# Patient Record
Sex: FEMALE | Race: ASIAN | NOT HISPANIC OR LATINO | Employment: OTHER | URBAN - METROPOLITAN AREA
[De-identification: names, ages, dates, MRNs, and addresses within clinical notes are randomized per-mention and may not be internally consistent; named-entity substitution may affect disease eponyms.]

---

## 2022-07-13 ENCOUNTER — OFFICE VISIT (OUTPATIENT)
Dept: FAMILY MEDICINE | Facility: CLINIC | Age: 37
End: 2022-07-13
Payer: COMMERCIAL

## 2022-07-13 VITALS
TEMPERATURE: 98.3 F | DIASTOLIC BLOOD PRESSURE: 85 MMHG | HEART RATE: 77 BPM | RESPIRATION RATE: 15 BRPM | OXYGEN SATURATION: 99 % | WEIGHT: 176 LBS | SYSTOLIC BLOOD PRESSURE: 130 MMHG

## 2022-07-13 DIAGNOSIS — M79.672 PAIN OF LEFT MIDFOOT: Primary | ICD-10-CM

## 2022-07-13 PROCEDURE — 99203 OFFICE O/P NEW LOW 30 MIN: CPT | Performed by: FAMILY MEDICINE

## 2022-07-13 RX ORDER — NAPROXEN 250 MG/1
250 TABLET ORAL 2 TIMES DAILY WITH MEALS
Qty: 50 TABLET | Refills: 0 | Status: SHIPPED | OUTPATIENT
Start: 2022-07-13 | End: 2022-08-01

## 2022-07-13 NOTE — PATIENT INSTRUCTIONS
May try cushioned mats when standing a long time (ie kitchen).     Ice packs on the foot when very painful.     Take naproxen twice a day with food for 1-2 weeks. If no help may stop otherwise may use as needed.     Referral to podiatry, foot doctor for further management.   Call [phone] to make appointment at [facility] for sometime in the next month or so.     Return sooner if worse.

## 2022-07-14 NOTE — PROGRESS NOTES
Assessment/Plan:   Pain of left midfoot  Persistent intermittent pain in the distal left midfoot dorsally. Consider a tendonitis or neuroma. Will use regular dosing antiinflammatory with food, ice, rest, avoid positions that increase pain and follow up with podiatry for further management if not improving with the antiinflammatory.   - naproxen (NAPROSYN) 250 MG tablet; Take 1 tablet (250 mg) by mouth 2 times daily (with meals) Take with food for 1-2 weeks regularly then as needed  Dispense: 50 tablet; Refill: 0  - Orthopedic  Referral; Future    Consider a neuroma.     I discussed red flag symptoms, return precautions to clinic/ER and follow up care with patient/parent.  Expected clinical course, symptomatic cares advised. Questions answered. Patient/parent amenable with plan.    May try cushioned mats when standing a long time (ie kitchen).     Ice packs on the foot when very painful.     Take naproxen twice a day with food for 1-2 weeks. If no help may stop otherwise may use as needed.     Referral to podiatry, foot doctor for further management.   Call [phone] to make appointment at [facility] for sometime in the next month or so.     Return sooner if worse.     Subjective:     Abelino Lozano is a 37 year old female who presents for evaluation of left foot pain. This has been present for about 6 months but getting worse. There was no injury or apparent overuse, no trigger from certain shoes.   She notices pain in the top of foot mainly mid to distal region. Pain can occur if she is on her feet too long - standing, such as making dinner. The worst pain usually comes after she has been sitting cross legged on the floor or sofa for a period of time which she does often. Then the pain is severe and limits walking for awhile. No rash, no paresthesias, no redness or swelling.   Occurs barefoot or with shoes.   Otherwise generally healthy.     No Known Allergies     Current Outpatient Medications    Medication     naproxen (NAPROSYN) 250 MG tablet     No current facility-administered medications for this visit.     There is no problem list on file for this patient.      Objective:     /85   Pulse 77   Temp 98.3  F (36.8  C)   Resp 15   Wt 79.8 kg (176 lb)   LMP 06/20/2022   SpO2 99%   Breastfeeding No     Physical    General Appearance: Alert, pleasant, no distress, aVSS. Breathing easy, normal gait.   Head: Normocephalic, without obvious abnormality, atraumatic  Eyes: Conjunctivae are normal.   Extremities: No lower extremity edema. No swelling or redness or deformity of left foot. Palpable pedal pulses and normal cap refill. Reproducible tenderness left dorsal mid to distal foot between 3-4 toes/MTs. No pain at the achilles or heel or plantar fascia. Ankle and toe motion normal.   Skin: Skin color, texture, turgor normal, no rashes or lesions  Psychiatric: Patient has a normal mood and affect.     This note has been dictated in part using voice recognition software.  Any grammatical or context distortions are unintentional and inherent to the software.  Please feel free to contact me directly for clarification if needed.

## 2022-07-29 ENCOUNTER — OFFICE VISIT (OUTPATIENT)
Dept: FAMILY MEDICINE | Facility: CLINIC | Age: 37
End: 2022-07-29
Payer: COMMERCIAL

## 2022-07-29 VITALS
SYSTOLIC BLOOD PRESSURE: 122 MMHG | WEIGHT: 177 LBS | BODY MASS INDEX: 33.42 KG/M2 | HEART RATE: 80 BPM | TEMPERATURE: 97.1 F | DIASTOLIC BLOOD PRESSURE: 78 MMHG | HEIGHT: 61 IN

## 2022-07-29 DIAGNOSIS — Z13.0 SCREENING FOR ENDOCRINE, METABOLIC AND IMMUNITY DISORDER: ICD-10-CM

## 2022-07-29 DIAGNOSIS — R73.03 PREDIABETES: ICD-10-CM

## 2022-07-29 DIAGNOSIS — Z13.220 SCREENING CHOLESTEROL LEVEL: ICD-10-CM

## 2022-07-29 DIAGNOSIS — Z13.29 SCREENING FOR ENDOCRINE, METABOLIC AND IMMUNITY DISORDER: ICD-10-CM

## 2022-07-29 DIAGNOSIS — Z13.228 SCREENING FOR ENDOCRINE, METABOLIC AND IMMUNITY DISORDER: ICD-10-CM

## 2022-07-29 DIAGNOSIS — Z11.59 NEED FOR HEPATITIS C SCREENING TEST: ICD-10-CM

## 2022-07-29 DIAGNOSIS — Z00.00 ENCOUNTER FOR PREVENTIVE CARE: Primary | ICD-10-CM

## 2022-07-29 DIAGNOSIS — Z11.4 SCREENING FOR HIV (HUMAN IMMUNODEFICIENCY VIRUS): ICD-10-CM

## 2022-07-29 LAB
CHOLEST SERPL-MCNC: 212 MG/DL
HBA1C MFR BLD: 6 % (ref 0–5.6)
HDLC SERPL-MCNC: 30 MG/DL
LDLC SERPL CALC-MCNC: 145 MG/DL
NONHDLC SERPL-MCNC: 182 MG/DL
TRIGL SERPL-MCNC: 185 MG/DL
TSH SERPL DL<=0.005 MIU/L-ACNC: 1.83 UIU/ML (ref 0.3–4.2)

## 2022-07-29 PROCEDURE — 87389 HIV-1 AG W/HIV-1&-2 AB AG IA: CPT | Performed by: NURSE PRACTITIONER

## 2022-07-29 PROCEDURE — 99395 PREV VISIT EST AGE 18-39: CPT | Performed by: NURSE PRACTITIONER

## 2022-07-29 PROCEDURE — 80061 LIPID PANEL: CPT | Performed by: NURSE PRACTITIONER

## 2022-07-29 PROCEDURE — 83036 HEMOGLOBIN GLYCOSYLATED A1C: CPT | Performed by: NURSE PRACTITIONER

## 2022-07-29 PROCEDURE — 86803 HEPATITIS C AB TEST: CPT | Performed by: NURSE PRACTITIONER

## 2022-07-29 PROCEDURE — 36415 COLL VENOUS BLD VENIPUNCTURE: CPT | Performed by: NURSE PRACTITIONER

## 2022-07-29 PROCEDURE — 84443 ASSAY THYROID STIM HORMONE: CPT | Performed by: NURSE PRACTITIONER

## 2022-07-29 NOTE — PROGRESS NOTES
"  Assessment & Plan     Screening for endocrine, metabolic and immunity disorder    - Hemoglobin A1c (aka HBA1C)  - TSH with free T4 reflex  - HIV Antigen Antibody Combo  - Hemoglobin A1c (aka HBA1C)  - TSH with free T4 reflex    Prediabetes    - Hemoglobin A1c (aka HBA1C)    Encounter for preventive care      Screening for HIV (human immunodeficiency virus)    - HIV Antigen Antibody Combo    Need for hepatitis C screening test    - Hepatitis C antibody  - Hepatitis C antibody    Screening cholesterol level    - Lipid Profile (Chol, Trig, HDL, LDL calc)  - Lipid Profile (Chol, Trig, HDL, LDL calc)    BMI 33.0-33.9,adult      - your cholesterol is elevated at 212. Your LDL is also elevated at 145. Your HDL (good cholesterol) is low at 30. Ways to improve these values is start to eat a diet low in saturated fats, follow the mediterranean diet, eat red meat no more than 1-2 per month, and try to exercise at least 150 minutes per week. Recheck this lab next year.   - Your HGBA1c is elevated and is now within the prediabetic range. We definitely want to prevent any further increase, 6.5 and higher is diabetes type II. I won't prescribe any medications at this time, but we need to monitor this lab. So please start to work on 150 minutes of exercise per week and low carbohydrate diet. Try to lose 1-2 pounds per week, goal is 5 lbs in three months. Recheck lab in 3 months.   - all other labs are within normal ranges.   - diet and exercise discussed.   :[id number]     BMI:   Estimated body mass index is 33.17 kg/m  as calculated from the following:    Height as of this encounter: 1.556 m (5' 1.25\").    Weight as of this encounter: 80.3 kg (177 lb).   Weight management plan: Discussed healthy diet and exercise guidelines        Return in about 3 months (around 10/29/2022) for Follow up, with me for weight check and labs.    MIKEY Molina [facility] [place]    Allen Roca is a 37 " "year old accompanied by her spouse, presenting for the following health issues:  Establish Care      She is present with her . She moved to [place] from [place] in November 2022. She is up to date on her Covid Vaccine and declined all other vaccines today. She has two children and noticing her weight is not going down and a bigger neck circumference. She would like to get routine labs done today. Denied any medical conditions and had c-sections for surgeries. She tries to eat healthy, does not drink soda or too much juice. Eats red rice and not as much white rice.      No sx currently. Has never been treated with medication.       How many servings of fruits and vegetables do you eat daily?  2-3    On average, how many sweetened beverages do you drink each day (Examples: soda, juice, sweet tea, etc.  Do NOT count diet or artificially sweetened beverages)?   0    How many days per week do you exercise enough to make your heart beat faster? 3 or less    How many minutes a day do you exercise enough to make your heart beat faster? 10 - 19    How many days per week do you miss taking your medication? 0      Review of Systems   Constitutional, HEENT, cardiovascular, pulmonary, GI, , musculoskeletal, neuro, skin, endocrine and psych systems are negative, except as otherwise noted.      Objective    /78 (BP Location: Right arm, Patient Position: Sitting, Cuff Size: Adult Large)   Pulse 80   Temp 97.1  F (36.2  C) (Temporal)   Ht 1.556 m (5' 1.25\")   Wt 80.3 kg (177 lb)   LMP 06/20/2022   BMI 33.17 kg/m    Body mass index is 33.17 kg/m .     Physical Exam   GENERAL: healthy, alert and no distress  EYES: Eyes grossly normal to inspection, PERRL and conjunctivae and sclerae normal  HENT: ear canals and TM's normal, nose and mouth without ulcers or lesions  NECK: no adenopathy, no asymmetry, masses, or scars and thyroid normal to palpation  RESP: lungs clear to auscultation - no rales, rhonchi or " wheezes  BREAST: normal without masses, tenderness or nipple discharge and no palpable axillary masses or adenopathy  CV: regular rate and rhythm, normal S1 S2, no S3 or S4, no murmur, click or rub, no peripheral edema and peripheral pulses strong  ABDOMEN: soft, nontender, no hepatosplenomegaly, no masses and bowel sounds normal  MS: no gross musculoskeletal defects noted, no edema  SKIN: no suspicious lesions or rashes  NEURO: Normal strength and tone, mentation intact and speech normal  PSYCH: mentation appears normal, affect normal/bright    No results found for any previous visit.     Recent Results (from the past 720 hour(s))   HIV Antigen Antibody Combo    Collection Time: 07/29/22 10:52 AM   Result Value Ref Range    HIV Antigen Antibody Combo Nonreactive Nonreactive   Hepatitis C antibody    Collection Time: 07/29/22 10:52 AM   Result Value Ref Range    Hepatitis C Antibody Nonreactive Nonreactive   Hemoglobin A1c (aka HBA1C)    Collection Time: 07/29/22 10:52 AM   Result Value Ref Range    Hemoglobin A1C 6.0 (H) 0.0 - 5.6 %   Lipid Profile (Chol, Trig, HDL, LDL calc)    Collection Time: 07/29/22 10:52 AM   Result Value Ref Range    Cholesterol 212 (H) <200 mg/dL    Triglycerides 185 (H) <150 mg/dL    Direct Measure HDL 30 (L) >=50 mg/dL    LDL Cholesterol Calculated 145 (H) <=100 mg/dL    Non HDL Cholesterol 182 (H) <130 mg/dL   TSH with free T4 reflex    Collection Time: 07/29/22 10:52 AM   Result Value Ref Range    TSH 1.83 0.30 - 4.20 uIU/mL     HI    - your cholesterol is elevated at 212. Your LDL is also elevated at 145. Your HDL (good cholesterol) is low at 30. Ways to improve these values is start to eat a diet low in saturated fats, follow the mediterranean diet, eat red meat no more than 1-2 per month, and try to exercise at least 150 minutes per week. Recheck this lab next year.   - Your HGBA1c is elevated and is now within the prediabetic range. We definitely want to prevent any further  increase, 6.5 and higher is diabetes type II. I won't prescribe any medications at this time, but we need to monitor this lab. So please start to work on 150 minutes of exercise per week and low carbohydrate diet. Try to lose 1-2 pounds per week, goal is 5 lbs in three months. Recheck lab in 3 months.   - all other labs are within normal ranges.     MIKEY Molina CNP                .  ..

## 2022-07-30 LAB
HCV AB SERPL QL IA: NONREACTIVE
HIV 1+2 AB+HIV1 P24 AG SERPL QL IA: NONREACTIVE

## 2022-08-01 PROBLEM — R73.03 PREDIABETES: Status: ACTIVE | Noted: 2022-08-01

## 2022-08-10 ENCOUNTER — OFFICE VISIT (OUTPATIENT)
Dept: PODIATRY | Facility: CLINIC | Age: 37
End: 2022-08-10
Attending: FAMILY MEDICINE
Payer: COMMERCIAL

## 2022-08-10 VITALS — HEIGHT: 61 IN | OXYGEN SATURATION: 98 % | HEART RATE: 93 BPM | WEIGHT: 177 LBS | BODY MASS INDEX: 33.42 KG/M2

## 2022-08-10 DIAGNOSIS — M21.6X1 PRONATION DEFORMITY OF BOTH FEET: Primary | ICD-10-CM

## 2022-08-10 DIAGNOSIS — M21.6X2 PRONATION DEFORMITY OF BOTH FEET: Primary | ICD-10-CM

## 2022-08-10 DIAGNOSIS — M79.672 PAIN OF LEFT MIDFOOT: ICD-10-CM

## 2022-08-10 PROCEDURE — 99203 OFFICE O/P NEW LOW 30 MIN: CPT | Performed by: PODIATRIST

## 2022-08-10 ASSESSMENT — PAIN SCALES - GENERAL: PAINLEVEL: NO PAIN (1)

## 2022-08-10 NOTE — PATIENT INSTRUCTIONS
What are Prescription Custom Orthotics?  Custom orthotics are specially-made devices designed to support and comfort your feet. Prescription orthotics are crafted for you and no one else. They match the contours of your feet precisely and are designed for the way you move. Orthotics are only manufactured after a podiatrist has conducted a complete evaluation of your feet, ankles, and legs, so the orthotic can accommodate your unique foot structure and pathology.  Prescription orthotics are divided into two categories:  Functional orthotics are designed to control abnormal motion. They may be used to treat foot pain caused by abnormal motion; they can also be used to treat injuries such as shin splints or tendinitis. Functional orthotics are usually crafted of a semi-rigid material such as plastic or graphite.  Accommodative orthotics are softer and meant to provide additional cushioning and support. They can be used to treat diabetic foot ulcers, painful calluses on the bottom of the foot, and other uncomfortable conditions.  Podiatrists use orthotics to treat foot problems such as plantar fasciitis, bursitis, tendinitis, diabetic foot ulcers, and foot, ankle, and heel pain. Clinical research studies have shown that podiatrist-prescribed foot orthotics decrease foot pain and improve function.  Orthotics typically cost more than shoe inserts purchased in a retail store, but the additional cost is usually well worth it. Unlike shoe inserts, orthotics are molded to fit each individual foot, so you can be sure that your orthotics fit and do what they're supposed to do. Prescription orthotics are also made of top-notch materials and last many years when cared for properly. Insurance often helps pay for prescription orthotics.  What are Shoe Inserts?   You've seen them at the grocery store and at the mall. You've probably even seen them on TV and online. Shoe inserts are any kind of non-prescription foot support designed  to be worn inside a shoe. Pre-packaged, mass produced, arch supports are shoe inserts. So are the  custom-made  insoles and foot supports that you can order online or at retail stores. Unless the device has been prescribed by a doctor and crafted for your specific foot, it's a shoe insert, not a custom orthotic device--despite what the ads might say.  Shoe inserts can be very helpful for a variety of foot ailments, including flat arches and foot and leg pain. They can cushion your feet, provide comfort, and support your arches, but they can't correct biomechanical foot problems or cure long-standing foot issues.  The most common types of shoe inserts are:  Arch supports: Some people have high arches. Others have low arches or flat feet. Arch supports generally have a  bumped-up  appearance and are designed to support the foot's natural arch.   Insoles: Insoles slip into your shoe to provide extra cushioning and support. Insoles are often made of gel, foam, or plastic.   Heel liners: Heel liners, sometimes called heel pads or heel cups, provide extra cushioning in the heel region. They may be especially useful for patients who have foot pain caused by age-related thinning of the heels' natural fat pads.   Foot cushions: Do your shoes rub against your heel or your toes? Foot cushions come in many different shapes and sizes and can be used as a barrier between you and your shoe.  Choosing an Over-the-Counter Shoe Insert  Selecting a shoe insert from the wide variety of devices on the market can be overwhelming. Here are some podiatrist-tested tips to help you find the insert that best meets your needs:  Consider your health. Do you have diabetes? Problems with circulation? An over-the-counter insert may not be your best bet. Diabetes and poor circulation increase your risk of foot ulcers and infections, so schedule an appointment with a podiatrist. He or she can help you select a solution that won't cause additional  health problems.   Think about the purpose. Are you planning to run a marathon, or do you just need a little arch support in your work shoes? Look for a product that fits your planned level of activity.   Bring your shoes. For the insert to be effective, it has to fit into your shoes. So bring your sneakers, dress shoes, or work boots--whatever you plan to wear with your insert. Look for an insert that will fit the contours of your shoe.   Try them on. If all possible, slip the insert into your shoe and try it out. Walk around a little. How does it feel? Don't assume that feelings of pressure will go away with continued wear. (If you can't try the inserts at the store, ask about the store's return policy and hold on to your receipt.)    Please call one of the [place] locations below to schedule an appointment. If you received a prescription please bring it with you to your appointment. Some locations are limited to what they carry.    Office Locations    [facility]  [address] [place] [address]  Home Medical Equipment, [address]   Phone: [phone]   Orthotics and Prosthetics, [address]   Phone: [phone]    [facility]  [address]  [address]   [place] [address]   Phone: [phone]    [facility]  [address]  [place] [address]  Phone: [phone]    [facility]   [address]  [place] [address]  Phone: [phone]    [facility]  [address]  [place] [address]  Phone: [phone]    [facility]  [address]  [place] [address]  Phone: [phone]    [place]   [address] [place] [address]   Phone: [phone]    WEARING YOUR CUSTOM FOOT ORTHOTICS   Most  insurance plans cover one pair of orthotics per year. You must check with your   insurance plan to see what your payment responsibility will be. Please call your   insurance company by calling the number on the back of your insurance card.   Orthotic's are non-refundable and non-returnable.   Orthotics are made of various designs. Some orthotics are covered with material that extends beyond your toes. If your orthotic is of this design, you will likely need to trim the toe end to get a proper fit. The insole from your shoe can be used as a template. Simply overlay the shoe insert on top of the custom orthotic. Align the heel end while tracing the length of the insert onto the custom orthotic. Use a large scissor to trim the toe end until you get a proper fit in the shoe.   The orthotic needs to be pushed as far back in the shoe as possible. The heel portion should not ride forward so as not to irritate your heel.   Orthotics are designed to work with socks. Excessive perspiration will shorten the life span of the orthotics. Remove the orthotic from the shoe frequently for proper drying.   The break-in period lasts for weeks. People new to orthotics will likely experience new aches and pains. The orthotic is forcing your foot into a new position. Arch, foot and leg muscle aches and fatigue are common during these weeks. Minor discomfort can be considered normal break in phenomenon. Start wearing your orthotic around your home your first day. Limited activity for one to two hours is recommended. You can increase one or two additional hours each day provided the aches and pains are subsiding. The degree of discomfort, fatigue and problems will dictate the speed of break in. You may require multiple weeks to work up to full time use.   Do not continue wearing your orthotics if they are creating problems such as blisters or sores. Do not hesitate to call the clinic to speak with a nurse regarding orthotic   break in,  fit, trimming, etc. You may also need to see the doctor if the orthotics are   simply not working out. Adjustments are sometimes made to improve orthotic   function.     Orthotics will only work in certain styles and types of shoes. Orthotics rarely work in dress shoes. Slip-ons, clogs, sandals and heels are particularly troublesome. Specially designed orthotics may be necessary for these types of shoes. Your custom orthotic was designed for activities that require appropriate walking or running shoes. Lace up athletic shoes, walking shoes or work boots should work appropriately. You may need a wider or longer shoe. Shoes with a removable  or insert work best. In general, you want to remove an insert from the shoe before placing the orthotic into the shoe. Shoes without a removable liner may not work as well.     When purchasing new shoes, bring your orthotics along to get a proper fit. Shop at stores that are familiar with orthotics.   Frequent washing of the orthotic may shorten the life span of the top cover. The top cover can be replaced but will generally last one to five years depending on use and foot perspiration.

## 2022-08-10 NOTE — LETTER
8/10/2022         RE: Abelino Lozano  [address]  [place] [address]        Dear Colleague,    Thank you for referring your patient, Abelino Lozano, to the [facility]. Please see a copy of my visit note below.    FOOT AND ANKLE SURGERY/PODIATRY CONSULT NOTE         ASSESSMENT:   Left foot pain  Pronation deformity      TREATMENT:  An x-ray of the left foot was taken today.  The x-rays were read and interpreted by this physician today.  I informed the patient the x-rays were negative for osseous or soft tissue abnormalities.  I informed the patient that her pain may be due to the position of her foot when she is sitting with her legs crossed and also her pronation deformity.  The patient was placed on ibuprofen 600 mg 1 tab  3 times daily.  I have also recommended orthotics.  I have asked the patient to return to the clinic her pain persist at which time I will recommend an MRI of her left foot.       HPI: I was asked to see Abelino Lozano today to evaluate her left foot pain.  The patient indicated for the past several months she has had pain in the top of her left foot near the third and fourth metatarsals.  She describes it as an aching pain which is aggravated with weightbearing and ambulation.  She has no pain while resting.  She denies any trauma to her left foot.  She has not had any associated redness or swelling.  She stated that she does have the occasion where she sits with her legs folded underneath her.  She stated that when she sits in this position the left foot pain is severe.  The pain is nonradiating.  She denies any other previous treatment.  The patient was seen in consultation at the request of Gege Stoner MD for evaluation and treatment of left foot pain.     Past Medical History:   Diagnosis Date     Diet controlled gestational diabetes mellitus (GDM), antepartum        Social History     Socioeconomic History     Marital status:       Spouse name: Not on file     Number of children: Not on file     Years of education: Not on file     Highest education level: Not on file   Occupational History     Not on file   Tobacco Use     Smoking status: Never Smoker     Smokeless tobacco: Never Used   Vaping Use     Vaping Use: Never used   Substance and Sexual Activity     Alcohol use: Never     Drug use: Never     Sexual activity: Not on file   Other Topics Concern     Not on file   Social History Narrative     Not on file     Social Determinants of Health     Financial Resource Strain: Not on file   Food Insecurity: Not on file   Transportation Needs: Not on file   Physical Activity: Not on file   Stress: Not on file   Social Connections: Not on file   Intimate Partner Violence: Not on file   Housing Stability: Not on file        No Known Allergies     No current outpatient medications on file.     Family History   Problem Relation Age of Onset     Diabetes Type 2  Mother      Hypertension Mother      Myocardial Infarction Father         Social History     Socioeconomic History     Marital status:      Spouse name: Not on file     Number of children: Not on file     Years of education: Not on file     Highest education level: Not on file   Occupational History     Not on file   Tobacco Use     Smoking status: Never Smoker     Smokeless tobacco: Never Used   Vaping Use     Vaping Use: Never used   Substance and Sexual Activity     Alcohol use: Never     Drug use: Never     Sexual activity: Not on file   Other Topics Concern     Not on file   Social History Narrative     Not on file     Social Determinants of Health     Financial Resource Strain: Not on file   Food Insecurity: Not on file   Transportation Needs: Not on file   Physical Activity: Not on file   Stress: Not on file   Social Connections: Not on file   Intimate Partner Violence: Not on file   Housing Stability: Not on file        Review of Systems - Patient denies fever,  "chills, rash, wound, stiffness, limping, numbness, weakness, heart burn, blood in stool, chest pain with activity, calf pain when walking, shortness of breath with activity, chronic cough, easy bleeding/bruising, swelling of ankles, excessive thirst, fatigue, depression, anxiety.  Patient admits to left foot pain.      OBJECTIVE:  Appearance: alert, well appearing, and in no distress.    Pulse 93   Ht 1.556 m (5' 1.25\")   Wt 80.3 kg (177 lb)   LMP 06/20/2022   SpO2 98%   BMI 33.17 kg/m       Body mass index is 33.17 kg/m .     General appearance: Patient is alert and fully cooperative with history & exam.  No sign of distress is noted during the visit.  Psychiatric: Affect is pleasant & appropriate.  Patient appears motivated to improve health.  Respiratory: Breathing is regular & unlabored while sitting.  HEENT: Hearing is intact to spoken word.  Speech is clear.  No gross evidence of visual impairment that would impact ambulation.    Vascular: Dorsalis pedis and posterior tibial pulses are palpable. There is pedal hair growth bilaterally.  CFT < 3 sec from anterior tibial surface to distal digits bilaterally. There is no appreciable edema noted.  Dermatologic: Turgor and texture are within normal limits. No coloration or temperature changes. No primary or secondary lesions noted.  Neurologic: All epicritic and proprioceptive sensations are grossly intact bilaterally.  Negative Lizy sign noted second and third intermetatarsal space left foot.  Musculoskeletal: All active and passive ankle, subtalar, midtarsal, and 1st MPJ range of motion are grossly intact without pain or crepitus, with the exception of none. Manual muscle strength is within normal limits bilaterally. All dorsiflexors, plantarflexors, invertors, evertors are intact bilaterally.  Minimal tenderness present to the dorsal lateral aspect of the left foot on palpation.  No tenderness to the left foot or ankle with range of motion. Calf is " soft/non-tender without warmth/induration    Imaging:       No images are attached to the encounter or orders placed in the encounter.     No results found.   No results found.       Donavan Ramirez DPM  [facility] Foot & Ankle Surgery/Podiatry         Again, thank you for allowing me to participate in the care of your patient.        Sincerely,        Donavan Carr DPM

## 2022-08-10 NOTE — PROGRESS NOTES
FOOT AND ANKLE SURGERY/PODIATRY CONSULT NOTE         ASSESSMENT:   Left foot pain  Pronation deformity      TREATMENT:  An x-ray of the left foot was taken today.  The x-rays were read and interpreted by this physician today.  I informed the patient the x-rays were negative for osseous or soft tissue abnormalities.  I informed the patient that her pain may be due to the position of her foot when she is sitting with her legs crossed and also her pronation deformity.  The patient was placed on ibuprofen 600 mg 1 tab  3 times daily.  I have also recommended orthotics.  I have asked the patient to return to the clinic her pain persist at which time I will recommend an MRI of her left foot.       HPI: I was asked to see Abelino Lozano today to evaluate her left foot pain.  The patient indicated for the past several months she has had pain in the top of her left foot near the third and fourth metatarsals.  She describes it as an aching pain which is aggravated with weightbearing and ambulation.  She has no pain while resting.  She denies any trauma to her left foot.  She has not had any associated redness or swelling.  She stated that she does have the occasion where she sits with her legs folded underneath her.  She stated that when she sits in this position the left foot pain is severe.  The pain is nonradiating.  She denies any other previous treatment.  The patient was seen in consultation at the request of Gege Stoner MD for evaluation and treatment of left foot pain.     Past Medical History:   Diagnosis Date     Diet controlled gestational diabetes mellitus (GDM), antepartum        Social History     Socioeconomic History     Marital status:      Spouse name: Not on file     Number of children: Not on file     Years of education: Not on file     Highest education level: Not on file   Occupational History     Not on file   Tobacco Use     Smoking status: Never Smoker     Smokeless tobacco: Never  Used   Vaping Use     Vaping Use: Never used   Substance and Sexual Activity     Alcohol use: Never     Drug use: Never     Sexual activity: Not on file   Other Topics Concern     Not on file   Social History Narrative     Not on file     Social Determinants of Health     Financial Resource Strain: Not on file   Food Insecurity: Not on file   Transportation Needs: Not on file   Physical Activity: Not on file   Stress: Not on file   Social Connections: Not on file   Intimate Partner Violence: Not on file   Housing Stability: Not on file        No Known Allergies     No current outpatient medications on file.     Family History   Problem Relation Age of Onset     Diabetes Type 2  Mother      Hypertension Mother      Myocardial Infarction Father         Social History     Socioeconomic History     Marital status:      Spouse name: Not on file     Number of children: Not on file     Years of education: Not on file     Highest education level: Not on file   Occupational History     Not on file   Tobacco Use     Smoking status: Never Smoker     Smokeless tobacco: Never Used   Vaping Use     Vaping Use: Never used   Substance and Sexual Activity     Alcohol use: Never     Drug use: Never     Sexual activity: Not on file   Other Topics Concern     Not on file   Social History Narrative     Not on file     Social Determinants of Health     Financial Resource Strain: Not on file   Food Insecurity: Not on file   Transportation Needs: Not on file   Physical Activity: Not on file   Stress: Not on file   Social Connections: Not on file   Intimate Partner Violence: Not on file   Housing Stability: Not on file        Review of Systems - Patient denies fever, chills, rash, wound, stiffness, limping, numbness, weakness, heart burn, blood in stool, chest pain with activity, calf pain when walking, shortness of breath with activity, chronic cough, easy bleeding/bruising, swelling of ankles, excessive thirst, fatigue, depression,  "anxiety.  Patient admits to left foot pain.      OBJECTIVE:  Appearance: alert, well appearing, and in no distress.    Pulse 93   Ht 1.556 m (5' 1.25\")   Wt 80.3 kg (177 lb)   LMP 06/20/2022   SpO2 98%   BMI 33.17 kg/m       Body mass index is 33.17 kg/m .     General appearance: Patient is alert and fully cooperative with history & exam.  No sign of distress is noted during the visit.  Psychiatric: Affect is pleasant & appropriate.  Patient appears motivated to improve health.  Respiratory: Breathing is regular & unlabored while sitting.  HEENT: Hearing is intact to spoken word.  Speech is clear.  No gross evidence of visual impairment that would impact ambulation.    Vascular: Dorsalis pedis and posterior tibial pulses are palpable. There is pedal hair growth bilaterally.  CFT < 3 sec from anterior tibial surface to distal digits bilaterally. There is no appreciable edema noted.  Dermatologic: Turgor and texture are within normal limits. No coloration or temperature changes. No primary or secondary lesions noted.  Neurologic: All epicritic and proprioceptive sensations are grossly intact bilaterally.  Negative Lizy sign noted second and third intermetatarsal space left foot.  Musculoskeletal: All active and passive ankle, subtalar, midtarsal, and 1st MPJ range of motion are grossly intact without pain or crepitus, with the exception of none. Manual muscle strength is within normal limits bilaterally. All dorsiflexors, plantarflexors, invertors, evertors are intact bilaterally.  Minimal tenderness present to the dorsal lateral aspect of the left foot on palpation.  No tenderness to the left foot or ankle with range of motion. Calf is soft/non-tender without warmth/induration    Imaging:       No images are attached to the encounter or orders placed in the encounter.     No results found.   No results found.       Donavan Carr; CHRISTINA  [facility] Foot & Ankle Surgery/Podiatry     "

## 2022-10-03 ENCOUNTER — HEALTH MAINTENANCE LETTER (OUTPATIENT)
Age: 37
End: 2022-10-03

## 2023-10-22 ENCOUNTER — HEALTH MAINTENANCE LETTER (OUTPATIENT)
Age: 38
End: 2023-10-22

## 2024-12-15 ENCOUNTER — HEALTH MAINTENANCE LETTER (OUTPATIENT)
Age: 39
End: 2024-12-15